# Patient Record
Sex: FEMALE | Race: OTHER | Employment: UNEMPLOYED | URBAN - METROPOLITAN AREA
[De-identification: names, ages, dates, MRNs, and addresses within clinical notes are randomized per-mention and may not be internally consistent; named-entity substitution may affect disease eponyms.]

---

## 2019-06-19 NOTE — ED NOTES
Phuc Pereyra is here for eval c/o intermittent lower back pain, hot flashes, anxiety, and nausea/vomiting x3. She appears anxious and tearful. She denies trauma. Rates pain 6/10 at this time.   Denies urinary symptoms, incontinence, diarrhea, or abdominal pain

## 2019-06-19 NOTE — ED PROVIDER NOTES
Patient Seen in: [facility] AND [facility] Emergency Department    History   Patient presents with:  Vomiting    Stated Complaint: Vomiting and REM, Trouble Focusing, Burning Body Sensation    HPI    43-year-old female with past medical history significant for p exudates  Eyes: Conjunctivae and EOM are normal. PERRLA  Neck: Normal range of motion. Neck supple. No tracheal deviation present. CV: s1s2+, RRR, no m/r/g, normal distal pulses  Pulmonary/Chest: CTA b/l with no rales, wheezes.   No [facility] wall tenderness [facility]  [phone]    In 2 days          Medications Prescribed:  Current Discharge Medication List    START taking these medications    LORazepam 0.5 MG Oral Tab  Take 1 tablet (0.5 mg total) by mouth 2 (two) times d

## 2019-06-19 NOTE — ED INITIAL ASSESSMENT (HPI)
Pt c/o intermittent vomiting and lower back pain. Reports subjective fevers. Also reports difficulty when trying to focus.

## 2021-07-08 ENCOUNTER — HOSPITAL ENCOUNTER (EMERGENCY)
Facility: HOSPITAL | Age: 31
Discharge: HOME | End: 2021-07-08
Payer: MEDICAID

## 2021-07-08 VITALS
TEMPERATURE: 98 F | SYSTOLIC BLOOD PRESSURE: 148 MMHG | DIASTOLIC BLOOD PRESSURE: 95 MMHG | BODY MASS INDEX: 40.97 KG/M2 | WEIGHT: 240 LBS | RESPIRATION RATE: 16 BRPM | OXYGEN SATURATION: 98 % | HEIGHT: 64 IN | HEART RATE: 84 BPM

## 2021-07-08 DIAGNOSIS — L03.90 CELLULITIS, UNSPECIFIED CELLULITIS SITE: Primary | ICD-10-CM

## 2021-07-08 LAB
B-HCG UR QL: NEGATIVE
BILIRUB UR QL: NEGATIVE
CLARITY UR: CLEAR
COLOR UR: YELLOW
GLUCOSE UR-MCNC: NEGATIVE MG/DL
HGB UR QL STRIP.AUTO: NEGATIVE
KETONES UR-MCNC: NEGATIVE MG/DL
LEUKOCYTE ESTERASE UR QL STRIP.AUTO: NEGATIVE
NITRITE UR QL STRIP.AUTO: NEGATIVE
PH UR: 5 [PH] (ref 5–8)
PROT UR-MCNC: NEGATIVE MG/DL
SP GR UR STRIP: 1.02 (ref 1–1.03)
UROBILINOGEN UR STRIP-ACNC: <2

## 2021-07-08 PROCEDURE — 81003 URINALYSIS AUTO W/O SCOPE: CPT | Performed by: NURSE PRACTITIONER

## 2021-07-08 PROCEDURE — 81025 URINE PREGNANCY TEST: CPT

## 2021-07-08 PROCEDURE — 99283 EMERGENCY DEPT VISIT LOW MDM: CPT

## 2021-07-08 RX ORDER — CEPHALEXIN 500 MG/1
500 CAPSULE ORAL 4 TIMES DAILY
Qty: 40 CAPSULE | Refills: 0 | Status: SHIPPED | OUTPATIENT
Start: 2021-07-08 | End: 2021-07-18

## 2021-07-08 NOTE — ED QUICK NOTES
Patient safe to DC home per NP. Able to dress self. DC teaching done, instructions reviewed with patient including when and how to follow up with healthcare providers and when to seek emergency care. The patient verbalizes understanding.  Patient ambulatory

## 2021-07-08 NOTE — ED INITIAL ASSESSMENT (HPI)
Richard presents to ED with c/o abdominal pain x 2 weeks. Patient states that she has had a previous surgery \"a long time ago\" and thought that she had scratched a previous incision causing the pain. Denies n/v. Denies fevers. Denies urinary changes.  Trace

## 2021-07-08 NOTE — ED PROVIDER NOTES
Patient Seen in: [facility] AND [facility] Emergency Department      History   Patient presents with:  Abdomen/Flank Pain    Stated Complaint: abdominal pain    HPI/Subjective:   HPI    18-year-old female with no significant history here today with complaints of Lungs: good inspiratory effort. +air entry bilaterally without wheezes, rhonchi, crackles. No accessory muscle use or tachypnea. Abdomen: Soft, nontender, nondistended. Active bowel sounds present. Back: No CVA tenderness.      Extremities plan, a verbalized understanding and agreement with the plan. I explained to the patient that emergent conditions may arise and to go to the ER for new, worsening or any persistent conditions.  I've explained the importance of taking all medicatons as pres

## (undated) NOTE — ED AVS SNAPSHOT
Sal Engle   MRN: [id number]    Department:  [facility] Emergency Department   Date of Visit:  6/19/2019           Disclosure     Insurance plans vary and the physician(s) referred by the ER may not be covered by your plan.  Please contact CARE PHYSICIAN AT ONCE OR RETURN IMMEDIATELY TO THE EMERGENCY DEPARTMENT. If you have been prescribed any medication(s), please fill your prescription right away and begin taking the medication(s) as directed.   If you believe that any of the medications